# Patient Record
Sex: FEMALE | Race: WHITE | NOT HISPANIC OR LATINO | URBAN - METROPOLITAN AREA
[De-identification: names, ages, dates, MRNs, and addresses within clinical notes are randomized per-mention and may not be internally consistent; named-entity substitution may affect disease eponyms.]

---

## 2023-10-31 ENCOUNTER — ALLIED HEALTH (OUTPATIENT)
Dept: INTEGRATIVE MEDICINE | Facility: CLINIC | Age: 68
End: 2023-10-31

## 2023-10-31 PROBLEM — R94.31 ABNORMAL ECG: Status: ACTIVE | Noted: 2021-04-21

## 2023-10-31 PROBLEM — E78.5 HYPERLIPIDEMIA LDL GOAL <70: Status: ACTIVE | Noted: 2021-04-21

## 2023-10-31 PROBLEM — C77.8: Status: ACTIVE | Noted: 2021-06-11

## 2023-10-31 PROBLEM — I25.5 CARDIOMYOPATHY, ISCHEMIC: Status: ACTIVE | Noted: 2021-05-31

## 2023-10-31 PROBLEM — C79.51 MALIGNANT NEOPLASM METASTATIC TO BONE (MULTI): Status: ACTIVE | Noted: 2021-04-28

## 2023-10-31 PROBLEM — I77.1 SUBCLAVIAN ARTERY STENOSIS (CMS-HCC): Status: ACTIVE | Noted: 2023-07-12

## 2023-10-31 PROBLEM — D64.9 ANEMIA: Status: ACTIVE | Noted: 2022-12-05

## 2023-10-31 PROBLEM — I50.22 CHRONIC HFREF (HEART FAILURE WITH REDUCED EJECTION FRACTION) (MULTI): Status: ACTIVE | Noted: 2021-05-31

## 2023-10-31 PROBLEM — I49.3 FREQUENT PVCS: Status: ACTIVE | Noted: 2021-08-25

## 2023-10-31 PROBLEM — D84.9 IMMUNOCOMPROMISED (MULTI): Status: ACTIVE | Noted: 2022-04-20

## 2023-10-31 PROBLEM — R91.8 LUNG INFILTRATE: Status: ACTIVE | Noted: 2021-04-26

## 2023-10-31 PROBLEM — E66.9 OBESITY, CLASS I, BMI 30-34.9: Status: ACTIVE | Noted: 2023-03-27

## 2023-10-31 PROBLEM — I25.10 CORONARY ARTERY DISEASE INVOLVING NATIVE CORONARY ARTERY OF NATIVE HEART WITHOUT ANGINA PECTORIS: Status: ACTIVE | Noted: 2021-04-21

## 2023-10-31 PROBLEM — N18.30 STAGE 3 CHRONIC KIDNEY DISEASE (MULTI): Status: ACTIVE | Noted: 2022-12-05

## 2023-10-31 PROBLEM — J44.89 COPD WITH CHRONIC BRONCHITIS (MULTI): Status: ACTIVE | Noted: 2021-04-26

## 2023-10-31 PROBLEM — I10 ESSENTIAL HYPERTENSION: Status: ACTIVE | Noted: 2021-04-21

## 2023-10-31 PROBLEM — C78.00 MALIGNANT NEOPLASM METASTATIC TO LUNG (MULTI): Status: ACTIVE | Noted: 2021-06-11

## 2023-10-31 PROBLEM — J98.4 RESTRICTIVE LUNG DISEASE: Status: ACTIVE | Noted: 2021-04-26

## 2023-10-31 RX ORDER — METOPROLOL SUCCINATE 25 MG/1
25 TABLET, EXTENDED RELEASE ORAL 2 TIMES DAILY
COMMUNITY

## 2023-10-31 RX ORDER — MOXIFLOXACIN HYDROCHLORIDE 400 MG/1
400 TABLET ORAL DAILY
COMMUNITY
Start: 2023-03-12

## 2023-10-31 RX ORDER — ROSUVASTATIN CALCIUM 20 MG/1
20 TABLET, COATED ORAL DAILY
COMMUNITY

## 2023-10-31 RX ORDER — SACUBITRIL AND VALSARTAN 24; 26 MG/1; MG/1
1 TABLET, FILM COATED ORAL 2 TIMES DAILY
COMMUNITY
Start: 2022-12-05

## 2023-10-31 RX ORDER — LETROZOLE 2.5 MG/1
2.5 TABLET, FILM COATED ORAL DAILY
COMMUNITY

## 2023-10-31 RX ORDER — ASPIRIN 81 MG/1
81 TABLET ORAL DAILY
COMMUNITY
Start: 2021-04-21

## 2023-10-31 RX ORDER — FERROUS SULFATE 325(65) MG
325 TABLET ORAL DAILY
COMMUNITY
Start: 2023-03-27

## 2023-10-31 RX ORDER — SODIUM CHLORIDE 0.9 % (FLUSH) 0.9 %
10 SYRINGE (ML) INJECTION
COMMUNITY
Start: 2022-10-27 | End: 2024-08-14

## 2023-10-31 RX ORDER — ETHAMBUTOL HYDROCHLORIDE 400 MG/1
2.5 TABLET, FILM COATED ORAL DAILY
COMMUNITY

## 2023-10-31 RX ORDER — MULTIVITAMIN
1 TABLET ORAL DAILY
COMMUNITY
Start: 2021-04-02

## 2023-10-31 RX ORDER — AMIKACIN 590 MG/8.4ML
SUSPENSION RESPIRATORY (INHALATION)
COMMUNITY
Start: 2023-10-11

## 2023-10-31 RX ORDER — ACETAMINOPHEN 500 MG
1 TABLET ORAL DAILY
COMMUNITY
Start: 2021-04-02

## 2023-10-31 RX ORDER — AZITHROMYCIN 250 MG/1
2 TABLET, FILM COATED ORAL DAILY
COMMUNITY

## 2023-10-31 NOTE — PATIENT INSTRUCTIONS
Continue Supplement recommendations:  Oil of Oregano (120 Softgels) ([employer]): Please take 1 gel, twice / day for 2 months (with meals).   Biocidin Broad Spectrum Liquid Formula (Formerly Advanced Formula) (1 Ounce) (Biocidin [employer]): Begin with 1 drop and gradually increase up to 5 drops 3 times per day. Add an additional drop every few days. Continue for 2 months then discontinue.   This is DROPS not DROPPERFULS. .     Nordic Eugenia Prebiotic Powder (7.02 Ounces) ([employer]): Start with 1/2 scoop mixed in cold drink of your choice for 2 weeks, then increase to 1 scoop daily.

## 2023-10-31 NOTE — PROGRESS NOTES
Acupuncture Visit:     Subjective   Patient ID: Rach Lamb is a 68 y.o. female who presents for No chief complaint on file.  Digestion- had a day with loose stool for 1 day after starting supplements  But has been more regulated more since   No cramping  BM- daily now, maybe more than once a day. Bm is firmer    Biocidin 3 drops a day.  Still working to 5 TID    Oregano- no issues.     Pet scan is improving, done 10/24  Oncologist is pleased with overall progress    Plan to be off abx in feb. Has appt with ID in Nov    Is continuing to lose hearing, thinks it is from the abx    Nasal congeston- runny nose- continues  No SOB    Sleep- no issues, occ waking at night.     Muscle pain left outer upper thighs,, muscular hip pain.         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     GI sx now- has days where she is feeling well. Occ cramping sensation. then very loose stool.   happens a little more in [place] when dehydrated.   BM- daily, sometimes a few times a day, generally well formed.      is taking Iron for low H/H, OTC     Diet:  B: prefect bar, cinnamon crunch cheerios, almond milk, ripple, blueberry, yogurt, granola  L: Cottage cheese, pineapple, crackers, grapes, salami cheese, fruit, chicken salad  D: protein, veggie, orzo, potato, pasta,   Sn: 100c ice cream bar, popcorn  dr: 2 cups tea,         Sleep- most nights are good. may wake 2x to urinate and back to sleep quickly.      Exercise- bike, 5x a week- 40-45.. , was walking ,but has been having more leg discomfort.      Supps-    Probiotic,  MVI  Vit D- 5000IU        Next ID is in nov  next scan - october                    Review of Systems         Provider reviewed plan for the acupuncture session, precautions and contraindications. Patient/guardian/hospital staff has given consent to treat with full understanding of what to expect during the session. Before acupuncture began, provider explained to the patient to communicate at any time if the procedure was causing discomfort past their tolerance level. Patient agreed to advise acupuncturist. The acupuncturist counseled the patient on the risks of acupuncture treatment including pain, infection, bleeding, and no relief of pain. The patient was positioned comfortably. There was no evidence of infection at the site of needle insertions.    Objective   Physical Exam                             Assessment/Plan

## 2024-02-02 ENCOUNTER — ALLIED HEALTH (OUTPATIENT)
Dept: INTEGRATIVE MEDICINE | Facility: CLINIC | Age: 69
End: 2024-02-02

## 2024-02-02 DIAGNOSIS — R91.8 LUNG INFILTRATE: ICD-10-CM

## 2024-02-02 DIAGNOSIS — D64.9 ANEMIA, UNSPECIFIED TYPE: Primary | ICD-10-CM

## 2024-02-02 PROCEDURE — IMC30 IMC EST 30 MIN: Performed by: NATUROPATH

## 2024-02-02 NOTE — PROGRESS NOTES
Acupuncture Visit:     Subjective   Patient ID: Rach Lamb is a 68 y.o. female who presents for No chief complaint on file.  Infection is cleared per ID  Off abx  Off biocidin and oregano    Other therapies are the same  Just had PET scan on Tuesday, everything is stable.   Saw some changes in lumbar that they believe is just arthritic changes    Saw nephrologist- started on new med sicne hgb still low  Swithced to nephrocaps  Increasing iron to 2 a day  Still very fatigued.   Hard time losing weight    BM- goes daily, sometimes a few times  Always formed    Sleep- good overall,     Hearing- decreased again, stopped abx shortly after last hearing test. Does notice a little recovery. Has appt in march.           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     GI sx now- has days where she is feeling well. Occ cramping sensation. then very loose stool.   happens a little more in [place] when dehydrated.   BM- daily, sometimes a few times a day, generally well formed.      is taking Iron for low H/H, OTC     Diet:  B: prefect bar, cinnamon crunch cheerios, almond milk, ripple, blueberry, yogurt, granola  L: Cottage cheese, pineapple, crackers, grapes, salami cheese, fruit, chicken salad  D: protein, veggie, orzo, potato, pasta,   Sn: 100c ice cream bar, popcorn  dr: 2 cups tea,         Sleep- most nights are good. may wake 2x to urinate and back to sleep quickly.      Exercise- bike, 5x a week- 40-45.. , was walking ,but has been having more leg discomfort.      Supps-   Probiotic,  MVI  Vit D- 5000IU         Next ID is in nov  next scan - october                    Review of Systems         Provider reviewed plan for the acupuncture session, precautions and contraindications. Patient/guardian/hospital staff has given consent to treat with full understanding of what to expect during the session. Before acupuncture began, provider explained to the patient to communicate at any time if the procedure was causing discomfort past their tolerance level. Patient agreed to advise acupuncturist. The acupuncturist counseled the patient on the risks of acupuncture treatment including pain, infection, bleeding, and no relief of pain. The patient was positioned comfortably. There was no evidence of infection at the site of needle insertions.    Objective   Physical Exam                             Assessment/Plan